# Patient Record
Sex: FEMALE | Race: WHITE
[De-identification: names, ages, dates, MRNs, and addresses within clinical notes are randomized per-mention and may not be internally consistent; named-entity substitution may affect disease eponyms.]

---

## 2018-03-28 ENCOUNTER — HOSPITAL ENCOUNTER (OUTPATIENT)
Dept: HOSPITAL 11 - JP.SDS | Age: 68
Discharge: HOME | End: 2018-03-28
Attending: SURGERY
Payer: MEDICARE

## 2018-03-28 VITALS — DIASTOLIC BLOOD PRESSURE: 80 MMHG | SYSTOLIC BLOOD PRESSURE: 145 MMHG

## 2018-03-28 DIAGNOSIS — Z86.010: ICD-10-CM

## 2018-03-28 DIAGNOSIS — K21.9: ICD-10-CM

## 2018-03-28 DIAGNOSIS — Z91.018: ICD-10-CM

## 2018-03-28 DIAGNOSIS — E78.5: ICD-10-CM

## 2018-03-28 DIAGNOSIS — Z88.1: ICD-10-CM

## 2018-03-28 DIAGNOSIS — K51.40: ICD-10-CM

## 2018-03-28 DIAGNOSIS — Z88.8: ICD-10-CM

## 2018-03-28 DIAGNOSIS — K57.30: ICD-10-CM

## 2018-03-28 DIAGNOSIS — E11.9: ICD-10-CM

## 2018-03-28 DIAGNOSIS — Z12.11: Primary | ICD-10-CM

## 2018-03-28 PROCEDURE — 45380 COLONOSCOPY AND BIOPSY: CPT

## 2018-03-28 NOTE — OR
DATE OF PROCEDURE:  03/28/2018

 

PREOPERATIVE DIAGNOSIS:  History of colon polyps.

 

POSTOPERATIVE DIAGNOSES:  Diverticulosis, small transverse colon polyp, history of colon

polyps.

 

PROCEDURE:  Colonoscopy to the cecum with biopsy resection of small transverse colon polyp.

 

SURGEON:  Bebo Amado M.D.

 

ASSISTANT:  Mitzi Varner MS-3.

 

ANESTHESIA:  IV anesthesia with monitored anesthesia care.

 

INDICATION:  This 67-year-old white female is referred for a colonoscopy because of a

history of colon polyps.  Her last colonoscopic exam was done 6 years ago, she says, and was

unremarkable.  I counseled her for the procedure including risks and alternatives, and she

gave her informed consent to proceed.

 

DESCRIPTION OF PROCEDURE:  The patient was placed in the left lateral decubitus position.

IV anesthesia was administered by the Anesthesia Service.  Time-out was held.  A rectal exam

was performed, which was unremarkable.  The flexible video Olympus colonoscope was

introduced through her anus, up her rectum, and out her colon all the way to the cecum.  En

route, in the transverse colon, we saw a small polyp, which was removed with a single bite

of the biopsy forceps.  Once the cecum was reached, the scope was slowly withdrawn,

examining the mucosa throughout.  No additional mucosal abnormalities were noted until we

reached the left colon.  Here, and in the sigmoid colon, we saw a few scattered diverticula.

There was no bleeding or inflammation associated with them.  The scope was retroflexed in

the rectum with the distal rectum appearing unremarkable.  The scope was straightened and

removed.  She tolerated the procedure well.

 

 

 

 

Bebo Amado MD

DD:  03/28/2018 08:17:16

DT:  03/28/2018 10:35:49

Job #:  [id number]

## 2025-03-06 ENCOUNTER — HOSPITAL ENCOUNTER (OUTPATIENT)
Dept: HOSPITAL 11 - JP.SDS | Age: 75
Discharge: HOME | End: 2025-03-06
Attending: SURGERY
Payer: MEDICARE

## 2025-03-06 VITALS — HEART RATE: 69 BPM | SYSTOLIC BLOOD PRESSURE: 180 MMHG | DIASTOLIC BLOOD PRESSURE: 86 MMHG

## 2025-03-06 DIAGNOSIS — K21.9: ICD-10-CM

## 2025-03-06 DIAGNOSIS — Z12.11: Primary | ICD-10-CM

## 2025-03-06 DIAGNOSIS — I10: ICD-10-CM

## 2025-03-06 DIAGNOSIS — K57.30: ICD-10-CM

## 2025-03-06 DIAGNOSIS — E11.9: ICD-10-CM

## 2025-03-06 DIAGNOSIS — D12.8: ICD-10-CM

## 2025-03-06 PROCEDURE — 88305 TISSUE EXAM BY PATHOLOGIST: CPT

## 2025-03-06 PROCEDURE — 45380 COLONOSCOPY AND BIOPSY: CPT
